# Patient Record
Sex: FEMALE | Race: WHITE | Employment: FULL TIME | URBAN - METROPOLITAN AREA
[De-identification: names, ages, dates, MRNs, and addresses within clinical notes are randomized per-mention and may not be internally consistent; named-entity substitution may affect disease eponyms.]

---

## 2018-01-15 ENCOUNTER — OFFICE VISIT (OUTPATIENT)
Dept: CARDIOLOGY CLINIC | Age: 43
End: 2018-01-15

## 2018-01-15 VITALS
RESPIRATION RATE: 16 BRPM | HEART RATE: 60 BPM | SYSTOLIC BLOOD PRESSURE: 120 MMHG | WEIGHT: 170 LBS | HEIGHT: 65 IN | BODY MASS INDEX: 28.32 KG/M2 | DIASTOLIC BLOOD PRESSURE: 70 MMHG | OXYGEN SATURATION: 99 %

## 2018-01-15 DIAGNOSIS — R07.9 CHEST PAIN, UNSPECIFIED TYPE: Primary | ICD-10-CM

## 2018-01-15 NOTE — PROGRESS NOTES
HISTORY OF PRESENT ILLNESS  Yecenia Benavidez is a 38 y.o. female. 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 Cardiovascular: Positive for chest pain and palpitations. Physical Exam  Physical Exam   Blood pressure 120/70, pulse 60, resp. rate 16, height 5' 4.5\" (1.638 m), weight 77.1 kg (170 lb), SpO2 99 %. Constitutional: She is oriented to person, place, and time. She appears well-developed and well-nourished. No distress. HENT: Head: Normocephalic. Eyes: No scleral icterus. Neck: Normal range of motion. Neck supple. No JVD present. No tracheal deviation present. Cardiovascular: Normal rate, regular rhythm, normal heart sounds and intact distal pulses. Exam reveals no gallop and no friction rub. No murmur heard. Pulmonary/Chest: Effort normal and breath sounds normal. No stridor. No respiratory distress, wheezes or  rales. Abdominal: She exhibits no distension. Musculoskeletal: She exhibits no edema. Neurological: She is alert and oriented to person, place, and time. Coordination normal.   Skin: Skin is warm. No rash noted. Not diaphoretic. No erythema. Psychiatric:  Normal mood and affect. Behavior is normal.   No current outpatient prescriptions on file prior to visit. No current facility-administered medications on file prior to visit.       No results found for: CHOL, CHOLPOCT, CHOLX, CHLST, CHOLV, HDL, HDLPOC, LDL, LDLCPOC, LDLC, DLDLP, VLDLC, VLDL, TGLX, TRIGL, TRIGP, TGLPOCT, CHHD, CHHDX    ASSESSMENT and PLAN  CP: in some ways atypical and I suspect muscle skeletal and stress induced but occurrence also of jaw pain needs further evaluation  Her ECG at there pcp office showed NSR and no significant st t changes  Discussed options  Proceed with stress echo  I suspect benign pvc as well but hold off on 24 hours holter for now  Decrease caffeine intake, weight loss and routine exercise discussed  Her BP is well controlled  Her tsh is normal and her cholesterol is slightly elevated she will follow with her pcp for cholesterol evaluation  I will see on a prn base if stress echo is normal

## 2018-01-15 NOTE — MR AVS SNAPSHOT
[facility] 
[phone] Patient: Oralia Martin MRN: [id number] Visit Information Date & Time Provider Department Dept. Phone Encounter #  
 1/15/2018 11:00 AM Lord Lisa MD [facility] [place] [phone] [id number] Upcoming Health Maintenance Date Due DTaP/Tdap/Td series (1 - Tdap) 6/20/1994 PAP AKA CERVICAL CYTOLOGY 6/20/1994 Influenza Age 5 to Adult 8/1/2017 Allergies as of 1/15/2018  Review Complete On: 1/15/2018 By: Fanta Doyle No Known Allergies Current Immunizations  Never Reviewed No immunizations on file. Not reviewed this visit Vitals BP Pulse Resp Height(growth percentile) Weight(growth percentile) SpO2  
 120/70 (BP 1 Location: Left arm, BP Patient Position: Sitting) 60 16 5' 4.5\" (1.638 m) 170 lb (77.1 kg) 99% BMI Smoking Status 28.73 kg/m2 Never Smoker Vitals History BMI and BSA Data Body Mass Index Body Surface Area 28.73 kg/m 2 1.87 m 2 Your Updated Medication List  
  
   
This list is accurate as of: 1/15/18 12:16 PM.  Always use your most recent med list. ADVIL 100 mg tablet Generic drug:  ibuprofen Take 100 mg by mouth every six (6) hours as needed for Pain. Patient Instructions Stress Echo (Will call result) Introducing [facility]! Rolando Daugherty introduces Ekotrope patient portal. Now you can access parts of your medical record, email your doctor's office, and request medication refills online. 1. In your internet browser, go to [email or web address] 2. Click on the First Time User? Click Here link in the Sign In box. You will see the New Member Sign Up page. 3. Enter your Ekotrope Access Code exactly as it appears below.  You will not need to use this code after youve completed the sign-up process. If you do not sign up before the expiration date, you must request a new code. · TheLadders Access Code: [email or web address] Expires: 4/5/2018  7:16 AM 
 
4. Enter the last four digits of your Social Security Number (xxxx) and Date of Birth (mm/dd/yyyy) as indicated and click Submit. You will be taken to the next sign-up page. 5. Create a TheLadders ID. This will be your TheLadders login ID and cannot be changed, so think of one that is secure and easy to remember. 6. Create a TheLadders password. You can change your password at any time. 7. Enter your Password Reset Question and Answer. This can be used at a later time if you forget your password. 8. Enter your e-mail address. You will receive e-mail notification when new information is available in [address] 9. Click Sign Up. You can now view and download portions of your medical record. 10. Click the Download Summary menu link to download a portable copy of your medical information. If you have questions, please visit the Frequently Asked Questions section of the TheLadders website. Remember, TheLadders is NOT to be used for urgent needs. For medical emergencies, dial 911. Now available from your iPhone and Android! Please provide this summary of care documentation to your next provider. Your primary care clinician is listed as Lula Baca. If you have any questions after today's visit, please call [phone].

## 2020-01-02 ENCOUNTER — OFFICE VISIT (OUTPATIENT)
Dept: INTERNAL MEDICINE CLINIC | Age: 45
End: 2020-01-02

## 2020-01-02 VITALS
BODY MASS INDEX: 30.49 KG/M2 | HEIGHT: 65 IN | HEART RATE: 64 BPM | DIASTOLIC BLOOD PRESSURE: 74 MMHG | RESPIRATION RATE: 18 BRPM | TEMPERATURE: 97.8 F | SYSTOLIC BLOOD PRESSURE: 124 MMHG | WEIGHT: 183 LBS | OXYGEN SATURATION: 99 %

## 2020-01-02 DIAGNOSIS — Z00.00 ENCOUNTER FOR MEDICAL EXAMINATION TO ESTABLISH CARE: Primary | ICD-10-CM

## 2020-01-02 DIAGNOSIS — E66.09 CLASS 1 OBESITY DUE TO EXCESS CALORIES WITHOUT SERIOUS COMORBIDITY IN ADULT, UNSPECIFIED BMI: ICD-10-CM

## 2020-01-02 DIAGNOSIS — M79.672 CHRONIC PAIN OF BOTH FEET: ICD-10-CM

## 2020-01-02 DIAGNOSIS — M79.671 CHRONIC PAIN OF BOTH FEET: ICD-10-CM

## 2020-01-02 DIAGNOSIS — Z13.1 SCREENING FOR DIABETES MELLITUS: ICD-10-CM

## 2020-01-02 DIAGNOSIS — G89.29 CHRONIC PAIN OF BOTH FEET: ICD-10-CM

## 2020-01-02 DIAGNOSIS — Z13.220 SCREENING FOR CHOLESTEROL LEVEL: ICD-10-CM

## 2020-01-02 NOTE — PROGRESS NOTES
Ms. Treasure Bess is a new patient who is here to establish care. CC:  Establish Care and Foot Pain       HPI:    54 yo woman presenting to establish care    Feet pain: Started over the summer in one foot 2019 then progressed to both feet. Pain over plantar aspect and sometimes pain all over feet. Has tried exercises, changing shoes and buying special inserts without improvement. Has gained weight since menopause, gaining 10 lbs per year  Feels better and stronger with exercise but frustrated with difficulty of loosing weight. Has a hx of migraines for several years which improve with ibuprofen. Standing in her feet makes pain worst    SH    Has step daughter  Teacher middle school  Non smoker   No ETOH    Review of systems:  Constitutional: negative for fever, chills, weight loss, night sweats   Eyes : negative for vision changes, eye pain and discharge  Nose and Throat: negative for tinnitus, sore throat   Cardiovascular: negative for chest pain, palpitations and shortness of breath  Respiratory: negative for shortness of breath, cough and wheezing   Gastroinstestinal: negative for abdominal pain, nausea, vomiting, diarrhea, constipation, and blood in the stool  Musculoskeletal: see HPI  Genitourinary: negative for dysuria, nocturia, polyuria and hematuria   Neurologic: Negative for focal weakness, numbness or incoordination  Skin: negative for rash, pruritus  Hematologic: negative for easy bruising      History reviewed. No pertinent past medical history. History reviewed. No pertinent surgical history. No Known Allergies    Current Outpatient Medications on File Prior to Visit   Medication Sig Dispense Refill    ibuprofen (ADVIL) 100 mg tablet Take 100 mg by mouth every six (6) hours as needed for Pain. No current facility-administered medications on file prior to visit.         family history includes Coronary Artery Disease in her mother; Hypertension in her father and mother. Social History     Socioeconomic History    Marital status:      Spouse name: Not on file    Number of children: Not on file    Years of education: Not on file    Highest education level: Not on file   Occupational History    Not on file   Social Needs    Financial resource strain: Not on file    Food insecurity:     Worry: Not on file     Inability: Not on file    Transportation needs:     Medical: Not on file     Non-medical: Not on file   Tobacco Use    Smoking status: Never Smoker    Smokeless tobacco: Never Used   Substance and Sexual Activity    Alcohol use: Yes    Drug use: No    Sexual activity: Yes     Partners: Male   Lifestyle    Physical activity:     Days per week: Not on file     Minutes per session: Not on file    Stress: Not on file   Relationships    Social connections:     Talks on phone: Not on file     Gets together: Not on file     Attends [employer] service: Not on file     Active member of club or organization: Not on file     Attends meetings of clubs or organizations: Not on file     Relationship status: Not on file    Intimate partner violence:     Fear of current or ex partner: Not on file     Emotionally abused: Not on file     Physically abused: Not on file     Forced sexual activity: Not on file   Other Topics Concern    Not on file   Social History Narrative    Not on file       Visit Vitals  /74 (BP 1 Location: Right arm, BP Patient Position: Sitting)   Pulse 64   Temp 97.8 °F (36.6 °C) (Oral)   Resp 18   Ht 5' 4.5\" (1.638 m)   Wt 183 lb (83 kg)   SpO2 99%   BMI 30.93 kg/m²     General:  Well appearing female no acute distress  HEENT:   PERRL,normal conjunctiva. External ear and canals normal, TMs normal.  Hearing normal to voice. Nose without edema or discharge, normal septum. Lips, teeth, gums normal.  Oropharynx: no erythema, no exudates, no lesions, normal tongue. Neck:  Supple. Thyroid normal size, nontender, without nodules.   No carotid bruit. No masses or lymphadenopathy  Respiratory: no respiratory distress,  no wheezing, no rhonchi, no rales. No chest wall tenderness. Cardiovascular:  RRR, normal S1S2, no murmur. Gastrointestinal: normal bowel sounds, soft, nontender, without masses. No hepatosplenomegaly. Extremities +2 pulses, no edema, normal sensation   Musculoskeletal:  Normal gait. Normal digits and nails. Normal strength and tone, no atrophy, and no abnormal movement. Skin:  No rash, no lesions, no ulcers. Skin warm, normal turgor, without induration or nodules. Neuro:  A and OX4, fluent speech, cranial nerves normal 2-12. Sensation normal to light touch. DTR symmetrical  Psych:  Normal affect    Feet exam: normal inspection, no point tenderness              Assessment and Plan:     1. Encounter for medical examination to establish care  - METABOLIC PANEL, COMPREHENSIVE  - CBC WITH AUTOMATED DIFF  - HEMOGLOBIN A1C W/O EAG  - LIPID PANEL    2. Screening for diabetes mellitus  - HEMOGLOBIN A1C W/O EAG    3. Screening for cholesterol level  - LIPID PANEL    4. Class 1 obesity due to excess calories without serious comorbidity in adult, unspecified BMI  Counseled on dietary changes   - REFERRAL TO NUTRITION    5. Chronic pain of both feet : consistent with plantar fascitis, given exercises   - REFERRAL TO PODIATRY      Follow-up and Dispositions    · Return in about 6 months (around 7/2/2020).           Dixon Chatman MD

## 2020-01-02 NOTE — PATIENT INSTRUCTIONS
Office Policies    Phone calls/patient messages:            Please allow up to 24 hours for someone in the office to contact you about your call or message. Be mindful your provider may be out of the office or your message may require further review. We encourage you to use Giftxoxo for your messages as this is a faster, more efficient way to communicate with our office                         Medication Refills:            Prescription medications require 48-72 business hours to process. We encourage you to use Giftxoxo for your refills. For controlled medications: Please allow 72 business hours to process. Certain medications may require you to  a written prescription at our office. NO narcotic/controlled medications will be prescribed after 4pm Monday through Friday or on weekends              Form/Paperwork Completion:            Please note a $25 fee may incur for all paperwork for completed by our providers. We ask that you allow 7-10 business days. Pre-payment is due prior to picking up/faxing the completed form. You may also download your forms to Giftxoxo to have your doctor print off. Plantar Fasciitis: Exercises  Introduction  Here are some examples of exercises for you to try. The exercises may be suggested for a condition or for rehabilitation. Start each exercise slowly. Ease off the exercises if you start to have pain. You will be told when to start these exercises and which ones will work best for you. How to do the exercises  Towel stretch    1. Sit with your legs extended and knees straight. 2. Place a towel around your foot just under the toes. 3. Hold each end of the towel in each hand, with your hands above your knees. 4. Pull back with the towel so that your foot stretches toward you. 5. Hold the position for at least 15 to 30 seconds. 6. Repeat 2 to 4 times a session, up to 5 sessions a day.     Calf stretch    1. Stand facing a wall with your hands on the wall at about eye level. Put the leg you want to stretch about a step behind your other leg. 2. Keeping your back heel on the floor, bend your front knee until you feel a stretch in the back leg. 3. Hold the stretch for 15 to 30 seconds. Repeat 2 to 4 times. Plantar fascia and calf stretch    1. Stand on a step as shown above. Be sure to hold on to the banister. 2. Slowly let your heels down over the edge of the step as you relax your calf muscles. You should feel a gentle stretch across the bottom of your foot and up the back of your leg to your knee. 3. Hold the stretch about 15 to 30 seconds, and then tighten your calf muscle a little to bring your heel back up to the level of the step. Repeat 2 to 4 times. Towel curls    1. While sitting, place your foot on a towel on the floor and scrunch the towel toward you with your toes. 2. Then, also using your toes, push the towel away from you. Hersey pickups    1. Put marbles on the floor next to a cup.  2. Using your toes, try to lift the marbles up from the floor and put them in the cup. Follow-up care is a key part of your treatment and safety. Be sure to make and go to all appointments, and call your doctor if you are having problems. It's also a good idea to know your test results and keep a list of the medicines you take. Where can you learn more? Go to http://teddy-rakel.info/. Lopez Durán in the search box to learn more about \"Plantar Fasciitis: Exercises. \"  Current as of: June 26, 2019  Content Version: 12.2  © 9840-6351 [employer]. Care instructions adapted under license by [employer] (which disclaims liability or warranty for this information). If you have questions about a medical condition or this instruction, always ask your healthcare professional. [place] [address] any warranty or liability for your use of this information.

## 2020-01-02 NOTE — PROGRESS NOTES
Reviewed record in preparation for visit and have obtained necessary documentation. Identified pt with two pt identifiers(name and ). Chief Complaint   Patient presents with   [address] Foot Pain       Health Maintenance Due   Topic Date Due    DTaP/Tdap/Td  (1 - Tdap) 1984    Pap Test  1994    Flu Vaccine  2019       Ms. Nazia Burroughs has a reminder for a \"due or due soon\" health maintenance. I have asked that she discuss this further with her primary care provider for follow-up on this health maintenance. Coordination of Care Questionnaire:  :     1) Have you been to an emergency room, urgent care clinic since your last visit? no   Hospitalized since your last visit? no             2) Have you seen or consulted any other health care providers outside of [address] [place] [address] since your last visit? no  (Include any pap smears or colon screenings in this section.)    3) In the event something were to happen to you and you were unable to speak on your behalf, do you have an Advance Directive/ Living Will in place stating your wishes? NO    Do you have an Advance Directive on file? no    4) Are you interested in receiving information on Advance Directives? NO    Patient is accompanied by self I have received verbal consent from Ynes Ruiz to discuss any/all medical information while they are present in the room.

## 2020-01-03 LAB
ALBUMIN SERPL-MCNC: 4.5 G/DL (ref 3.5–5.5)
ALBUMIN/GLOB SERPL: 1.8 {RATIO} (ref 1.2–2.2)
ALP SERPL-CCNC: 85 IU/L (ref 39–117)
ALT SERPL-CCNC: 16 IU/L (ref 0–32)
AST SERPL-CCNC: 14 IU/L (ref 0–40)
BASOPHILS # BLD AUTO: 0 X10E3/UL (ref 0–0.2)
BASOPHILS NFR BLD AUTO: 1 %
BILIRUB SERPL-MCNC: 0.3 MG/DL (ref 0–1.2)
BUN SERPL-MCNC: 15 MG/DL (ref 6–24)
BUN/CREAT SERPL: 17 (ref 9–23)
CALCIUM SERPL-MCNC: 9.7 MG/DL (ref 8.7–10.2)
CHLORIDE SERPL-SCNC: 104 MMOL/L (ref 96–106)
CHOLEST SERPL-MCNC: 266 MG/DL (ref 100–199)
CO2 SERPL-SCNC: 23 MMOL/L (ref 20–29)
CREAT SERPL-MCNC: 0.9 MG/DL (ref 0.57–1)
EOSINOPHIL # BLD AUTO: 0.1 X10E3/UL (ref 0–0.4)
EOSINOPHIL NFR BLD AUTO: 2 %
ERYTHROCYTE [DISTWIDTH] IN BLOOD BY AUTOMATED COUNT: 15 % (ref 12.3–15.4)
GLOBULIN SER CALC-MCNC: 2.5 G/DL (ref 1.5–4.5)
GLUCOSE SERPL-MCNC: 88 MG/DL (ref 65–99)
HBA1C MFR BLD: 5.4 % (ref 4.8–5.6)
HCT VFR BLD AUTO: 38.9 % (ref 34–46.6)
HDLC SERPL-MCNC: 72 MG/DL
HGB BLD-MCNC: 12.3 G/DL (ref 11.1–15.9)
IMM GRANULOCYTES # BLD AUTO: 0 X10E3/UL (ref 0–0.1)
IMM GRANULOCYTES NFR BLD AUTO: 0 %
LDLC SERPL CALC-MCNC: 171 MG/DL (ref 0–99)
LYMPHOCYTES # BLD AUTO: 2.2 X10E3/UL (ref 0.7–3.1)
LYMPHOCYTES NFR BLD AUTO: 32 %
MCH RBC QN AUTO: 23.1 PG (ref 26.6–33)
MCHC RBC AUTO-ENTMCNC: 31.6 G/DL (ref 31.5–35.7)
MCV RBC AUTO: 73 FL (ref 79–97)
MONOCYTES # BLD AUTO: 0.6 X10E3/UL (ref 0.1–0.9)
MONOCYTES NFR BLD AUTO: 8 %
NEUTROPHILS # BLD AUTO: 3.9 X10E3/UL (ref 1.4–7)
NEUTROPHILS NFR BLD AUTO: 57 %
PLATELET # BLD AUTO: 277 X10E3/UL (ref 150–450)
POTASSIUM SERPL-SCNC: 4.4 MMOL/L (ref 3.5–5.2)
PROT SERPL-MCNC: 7 G/DL (ref 6–8.5)
RBC # BLD AUTO: 5.33 X10E6/UL (ref 3.77–5.28)
SODIUM SERPL-SCNC: 142 MMOL/L (ref 134–144)
TRIGL SERPL-MCNC: 116 MG/DL (ref 0–149)
VLDLC SERPL CALC-MCNC: 23 MG/DL (ref 5–40)
WBC # BLD AUTO: 6.8 X10E3/UL (ref 3.4–10.8)

## 2020-01-12 NOTE — PROGRESS NOTES
Normal kidney and liver   Normal blood count   no diabetes  Cholesterol is elevated work on diet changes and exercise ( low fat, more grains) and repeat in 6 months if still in the same range will need to start cholesterol lowering agent

## 2020-02-03 ENCOUNTER — HOSPITAL ENCOUNTER (OUTPATIENT)
Dept: NUTRITION | Age: 45
Discharge: HOME | End: 2020-02-03
Payer: COMMERCIAL

## 2020-02-03 PROCEDURE — 97802 MEDICAL NUTRITION INDIV IN: CPT | Performed by: DIETITIAN, REGISTERED

## 2020-02-03 NOTE — PROGRESS NOTES
[address] [facility]     Nutrition Assessment  Medical Nutrition Therapy   Outpatient Initial Evaluation         Patient Name: Damian Peguero : 1975   Treatment Diagnosis: Obesity Class 1   Referral Source: Mony Hawley MD [facility] [place]): 2/3/2020     Gender: female Age: 53 y.o. Ht: 64.5 in Wt:  181.2 lb  kg   BMI: 30.6 BMR   Male  BMR Female 1455     Past Medical History:  Migrans, unintended weight gain, chest pain  Wt Readings from Last 3 Encounters:   20 83 kg (183 lb)   01/15/18 77.1 kg (170 lb)   wt at which felt most comfortable - 140#  Notes unhealthy weight loss due to not eating out of stress years ago. Pertinent Medications:     Current Outpatient Medications:     ibuprofen (ADVIL) 100 mg tablet, Take 100 mg by mouth every six (6) hours as needed for Pain., Disp: , Rfl:      Biochemical Data:   Lab Results   Component Value Date/Time    Hemoglobin A1c 5.4 2020 08:57 AM      Lab Results   Component Value Date/Time    Sodium 142 2020 08:57 AM    Potassium 4.4 2020 08:57 AM    Chloride 104 2020 08:57 AM    CO2 23 2020 08:57 AM    Glucose 88 2020 08:57 AM    BUN 15 2020 08:57 AM    Creatinine 0.90 2020 08:57 AM    BUN/Creatinine ratio 17 2020 08:57 AM    GFR est AA 89 2020 08:57 AM    GFR est non-AA 77 2020 08:57 AM    Calcium 9.7 2020 08:57 AM    Bilirubin, total 0.3 2020 08:57 AM    AST (SGOT) 14 2020 08:57 AM    Alk.  phosphatase 85 2020 08:57 AM    Protein, total 7.0 2020 08:57 AM    Albumin 4.5 2020 08:57 AM    A-G Ratio 1.8 2020 08:57 AM    ALT (SGPT) 16 2020 08:57 AM     Lab Results   Component Value Date/Time    Cholesterol, total 266 (H) 2020 08:57 AM    HDL Cholesterol 72 2020 08:57 AM    LDL, calculated 171 (H) 2020 08:57 AM    VLDL, calculated 23 2020 08:57 AM    Triglyceride 116 2020 08:57 AM        Assessment:      Pt is a 49yo female who has a 11 yo daughter and . She works in a high stress job as a teacher at [employer] Estimates 10-12 hours of work per day. Notes perimenopause symptoms for the past 4 years. Weight gain after plantar facitis caused inability to walk. Has a gym membership but due to time constraints has not been able to go regularly. Little sleep each night. Broken up 1-2 times per night with kid, dog, or hot flashes. Food & Nutrition: Notes typically larger portions than others around her. Difficulty with being a teacher to drink or eat during the school day except at designated times. Must use grab and go items she is able to eat and walk with. B- belvita/protein bar and coffee  S- belvita or fruit and more coffee  L- small due to limited time. Feeling very hungry. Ex: sandwich or salad + seltzer  Home ravenous  Snacking while making dinner: leftovers, chips and salsa, fruit  D- largest meal: rice, chicken, minimal vegetables.  and daughter are picky eaters  S- trying to limit  Drinking coffee throughout the day. Minimal water till home. Years ago was lots of soda. Now off. Wine on social occasions rarely  Loves ice cream but does not keep in house due to portion control issues     Estimate Needs   Calories: 5361-7415  Protein: 100 Carbs: 180 Fat: 53   Kcal/day  g/day  g/day  g/day        percent: 25  45  30               Nutrition Diagnosis obesity R/T excessive energy intake from large meals and snacks in 2nd half of the day secondary to hunger from going long periods without eating AEB BMI >30     Nutrition Intervention &  Education: Educated pt on the basics of healthy weight loss the rationale for dietary modifications and increased activity. Educated pt on lean proteins, healthy fats, non-starchy vegetables, and complex carbohydrate food sources. Discussed ,  meal timing, and appropriate serving sizes.  Created meal and snack ideas using Meal Builder   Handouts Provided: [x]  Carbohydrates  [x]  Protein  [x]  Non-starchy Vegatbles  []  Food Label  [x]  Meal and Snack Ideas  []  Food Journals []  Diabetes  []  Cholesterol  []  Sodium  []  Gen Nutr Guidelines  []  SBGM Guidelines  []  Others:   Information Reviewed with: pt   Readiness to Change Stage: []  Pre-contemplative    []  Contemplative  []  Preparation               []  Action                  []  Maintenance   Potential Barriers to Learning: []  Decline in memory    []  Language barrier   []  Other:  [x]  Emotional/ stress      []  Limited mobility  []  Lack of motivation     [] Vision, hearing or cognitive impairment   Expected Compliance: Fair due to perceived time barriers and lack of planning, lack of sleep     Nutritional Goal - To promote lifestyle changes to result in:    [x]  Weight loss  []  Improved diabetic control  []  Decreased cholesterol levels  []  Decreased blood pressure  []  Weight maintenance []  Preventing any interactions associated with food allergies  []  Adequate weight gain toward goal weight  []  Other:        Patient Goals:   - Eat 3 meals + 1-2 optional snacks each day.  (include complex carbohydrates + protein) - meals last 3-4 hours, snacks last 1-2 hours    - Improve physical activity w/goal to do weights 2 days per week, curves 1-2 days per week, and try walking as able 15min to start    -increase water intake by sipping slowly throughout the day   Total Time Spent: 60min    Dietitian Signature: Bryan Benjamin MS, RD, CSSD Date: 2/3/2020   Follow-up: 4 weeks Time: 6:27 PM

## 2022-03-18 PROBLEM — R07.9 CHEST PAIN: Status: ACTIVE | Noted: 2018-01-15

## 2023-06-06 ENCOUNTER — TELEPHONE (OUTPATIENT)
Age: 48
End: 2023-06-06

## 2023-06-06 NOTE — TELEPHONE ENCOUNTER
Two pt identifiers confirmed. I advised the patient, because she has not been seen in over 3 years, she is a new patient. The patient stated she is in a lot of pain, what should she do. I told her she can go to UC, ED or Ortho on Call. The patient would like to get in sooner than her 07/24/23 new patient appt with , if that is possible. I told her I would ask 's PSR know about request.     Pt verbalized understanding of information discussed w/ no further questions at this time.       [address]  [facility], [address] [place] [address] [address] [place] [address]  W: [phone]  F: [phone]

## 2023-06-06 NOTE — TELEPHONE ENCOUNTER
Pt states that two months ago she twisted wrong and has had the hip pain every since. She has trouble walking some days as well. She needs to be seen soon, but there are no appts. Please call to advise.

## 2023-07-17 ENCOUNTER — NURSE TRIAGE (OUTPATIENT)
Dept: OTHER | Facility: CLINIC | Age: 48
End: 2023-07-17

## 2023-07-17 NOTE — TELEPHONE ENCOUNTER
[facility]    Received call from [address] at [facility]; Patient with Red Flag Complaint requesting to establish care with [facility] 3rd floor. Subjective: Caller states \"Knee pain\"     Current Symptoms:   Intermittent, right knee pain  Burning sensation below the knee. \"If I press on it it feels like a hot iron. \"  Kneeling down worsens the pain  Denies redness or rash to the knee    Also, c/o right hip pain x 1 month and right ear discomfort x 2 weeks. Able to ambulate normally    Onset: 3 months ago; worsening    Pain Severity: 0/10; burning; intermittent    Temperature: Denies    What has been tried: Massage therapy for hip pain    Recommended disposition: See in Office Within 2 Weeks  Advised UCC if no available appts. Care advice provided, patient verbalizes understanding; denies any other questions or concerns; instructed to call back for any new or worsening symptoms. Patient/Caller agrees with recommended disposition; writer provided warm transfer to [facility] at [facility] for appointment scheduling    Attention Provider: Thank you for allowing me to participate in the care of your patient. The patient was connected to triage in response to information provided to the [facility]. Please do not respond through this encounter as the response is not directed to a shared pool.     Reason for Disposition   Knee pain is a chronic symptom (recurrent or ongoing AND present > 4 weeks)    Protocols used: Knee Pain-ADULT-

## 2023-08-07 ENCOUNTER — OFFICE VISIT (OUTPATIENT)
Age: 48
End: 2023-08-07
Payer: COMMERCIAL

## 2023-08-07 VITALS
OXYGEN SATURATION: 96 % | BODY MASS INDEX: 33.46 KG/M2 | WEIGHT: 196 LBS | HEIGHT: 64 IN | DIASTOLIC BLOOD PRESSURE: 71 MMHG | RESPIRATION RATE: 16 BRPM | HEART RATE: 62 BPM | TEMPERATURE: 97.3 F | SYSTOLIC BLOOD PRESSURE: 121 MMHG

## 2023-08-07 DIAGNOSIS — G43.909 MIGRAINE WITHOUT STATUS MIGRAINOSUS, NOT INTRACTABLE, UNSPECIFIED MIGRAINE TYPE: ICD-10-CM

## 2023-08-07 DIAGNOSIS — Z12.11 SCREEN FOR COLON CANCER: ICD-10-CM

## 2023-08-07 DIAGNOSIS — D56.9 THALASSEMIA, UNSPECIFIED TYPE: ICD-10-CM

## 2023-08-07 DIAGNOSIS — H35.60 RETINAL HEMORRHAGE, UNSPECIFIED LATERALITY: Primary | ICD-10-CM

## 2023-08-07 DIAGNOSIS — M25.551 RIGHT HIP PAIN: ICD-10-CM

## 2023-08-07 DIAGNOSIS — E78.5 HYPERLIPIDEMIA, UNSPECIFIED HYPERLIPIDEMIA TYPE: ICD-10-CM

## 2023-08-07 DIAGNOSIS — M25.561 RIGHT KNEE PAIN, UNSPECIFIED CHRONICITY: ICD-10-CM

## 2023-08-07 DIAGNOSIS — Z11.59 ENCOUNTER FOR HEPATITIS C SCREENING TEST FOR LOW RISK PATIENT: ICD-10-CM

## 2023-08-07 DIAGNOSIS — Z11.4 SCREENING FOR HUMAN IMMUNODEFICIENCY VIRUS WITHOUT PRESENCE OF RISK FACTORS: ICD-10-CM

## 2023-08-07 PROCEDURE — 99204 OFFICE O/P NEW MOD 45 MIN: CPT | Performed by: INTERNAL MEDICINE

## 2023-08-07 ASSESSMENT — PATIENT HEALTH QUESTIONNAIRE - PHQ9
2. FEELING DOWN, DEPRESSED OR HOPELESS: 0
SUM OF ALL RESPONSES TO PHQ9 QUESTIONS 1 & 2: 0
SUM OF ALL RESPONSES TO PHQ QUESTIONS 1-9: 0
1. LITTLE INTEREST OR PLEASURE IN DOING THINGS: 0

## 2023-08-07 ASSESSMENT — ANXIETY QUESTIONNAIRES
4. TROUBLE RELAXING: 3
IF YOU CHECKED OFF ANY PROBLEMS ON THIS QUESTIONNAIRE, HOW DIFFICULT HAVE THESE PROBLEMS MADE IT FOR YOU TO DO YOUR WORK, TAKE CARE OF THINGS AT HOME, OR GET ALONG WITH OTHER PEOPLE: NOT DIFFICULT AT ALL
7. FEELING AFRAID AS IF SOMETHING AWFUL MIGHT HAPPEN: 0
6. BECOMING EASILY ANNOYED OR IRRITABLE: 3
GAD7 TOTAL SCORE: 15
5. BEING SO RESTLESS THAT IT IS HARD TO SIT STILL: 3
2. NOT BEING ABLE TO STOP OR CONTROL WORRYING: 0
1. FEELING NERVOUS, ANXIOUS, OR ON EDGE: 3
3. WORRYING TOO MUCH ABOUT DIFFERENT THINGS: 3

## 2023-08-07 NOTE — PROGRESS NOTES
PROGRESS NOTE  Name: Luis Haynes   : 1975       ASSESSMENT/ PLAN:     Breana Guerrero was seen today for new patient. Retinal hemorrhage, unspecified laterality: Per ophthalmology. Right hip pain: This has largely resolved with deep tissue massage and time. Right knee pain, unspecified chronicity  -     AFL - Gagan Dillard MD, Orthopedic Surgery (knee), [place]    Migraine without status migrainosus, not intractable, unspecified migraine type: Ongoing. Thalassemia, unspecified type  -     CBC with Auto Differential; Future    Screen for colon cancer  -     AFL - Lacey Borja MD, Gastroenterology, [place]    Hyperlipidemia, unspecified hyperlipidemia type  -     Comprehensive Metabolic Panel; Future  -     Lipid Panel; Future    BMI 33.0-33.9,adult  -     TSH; Future    Encounter for hepatitis C screening test for low risk patient  -     Hepatitis C Antibody; Future    Screening for human immunodeficiency virus without presence of risk factors  -     HIV 1/2 Ag/Ab, 4TH Generation,W Rflx Confirm; Future    Return in about 1 year (around 2024) for CPE. I have reviewed the patient's medications and risks/side effects/benefits were discussed. Diagnosis(-es) explained to patient and questions answered. Literature provided where appropriate. SUBJECTIVE:   Ms. Luis Haynes is a 46 y.o. female who is here for follow up of routine medical issues. She saw Dr. David Hernandez one time, more than 3 years ago. Chief Complaint   Patient presents with    New Patient       She was noted by her eye doctor to have a retinal hemorrhage. \"He wanted my blood pressure checked. \"    She had some pain in R hip and R knee in May and . She got massage . \"There is a spot under my knee where I had sharp pain; I thought I had kneeled on a thumb tack. \" It's only there when she kneels on it. Weight: 183 lb in 2020. \"I've gained 70 lbs since menopause 10 years ago. \"  Wt Readings

## 2023-08-07 NOTE — PROGRESS NOTES
1. \"Have you been to the ER, urgent care clinic since your last visit? Hospitalized since your last visit? \" No    2. \"Have you seen or consulted any other health care providers outside of the [address] [place] [address] since your last visit? \" No     3. For patients aged 43-73: Has the patient had a colonoscopy / FIT/ Cologuard? No      If the patient is female:    4. For patients aged 43-66: Has the patient had a mammogram within the past 2 years? Yes - no Care Gap present      5. For patients aged 21-65: Has the patient had a pap smear?  Yes - no Care Gap present

## 2023-08-08 LAB
ALBUMIN SERPL-MCNC: 4.5 G/DL (ref 3.9–4.9)
ALBUMIN/GLOB SERPL: 1.9 {RATIO} (ref 1.2–2.2)
ALP SERPL-CCNC: 78 IU/L (ref 44–121)
ALT SERPL-CCNC: 19 IU/L (ref 0–32)
AST SERPL-CCNC: 12 IU/L (ref 0–40)
BILIRUB SERPL-MCNC: <0.2 MG/DL (ref 0–1.2)
BUN SERPL-MCNC: 16 MG/DL (ref 6–24)
BUN/CREAT SERPL: 19 (ref 9–23)
CALCIUM SERPL-MCNC: 9.2 MG/DL (ref 8.7–10.2)
CHLORIDE SERPL-SCNC: 103 MMOL/L (ref 96–106)
CHOLEST SERPL-MCNC: 274 MG/DL (ref 100–199)
CO2 SERPL-SCNC: 21 MMOL/L (ref 20–29)
CREAT SERPL-MCNC: 0.86 MG/DL (ref 0.57–1)
EGFRCR SERPLBLD CKD-EPI 2021: 82 ML/MIN/1.73
GLOBULIN SER CALC-MCNC: 2.4 G/DL (ref 1.5–4.5)
GLUCOSE SERPL-MCNC: 80 MG/DL (ref 70–99)
HCV IGG SERPL QL IA: NON REACTIVE
HDLC SERPL-MCNC: 53 MG/DL
HIV 1+2 AB+HIV1 P24 AG SERPL QL IA: NON REACTIVE
LDLC SERPL CALC-MCNC: 161 MG/DL (ref 0–99)
POTASSIUM SERPL-SCNC: 3.9 MMOL/L (ref 3.5–5.2)
PROT SERPL-MCNC: 6.9 G/DL (ref 6–8.5)
SODIUM SERPL-SCNC: 140 MMOL/L (ref 134–144)
TRIGL SERPL-MCNC: 321 MG/DL (ref 0–149)
TSH SERPL DL<=0.005 MIU/L-ACNC: 1.78 UIU/ML (ref 0.45–4.5)
VLDLC SERPL CALC-MCNC: 60 MG/DL (ref 5–40)

## 2023-08-09 LAB
ALBUMIN SERPL-MCNC: 4.2 G/DL (ref 3.9–4.9)
ALBUMIN/GLOB SERPL: 1.7 {RATIO} (ref 1.2–2.2)
ALP SERPL-CCNC: 80 IU/L (ref 44–121)
ALT SERPL-CCNC: 17 IU/L (ref 0–32)
AST SERPL-CCNC: 13 IU/L (ref 0–40)
BASOPHILS # BLD AUTO: 0.1 X10E3/UL (ref 0–0.2)
BASOPHILS NFR BLD AUTO: 1 %
BILIRUB SERPL-MCNC: <0.2 MG/DL (ref 0–1.2)
BUN SERPL-MCNC: 16 MG/DL (ref 6–24)
BUN/CREAT SERPL: 18 (ref 9–23)
CALCIUM SERPL-MCNC: 9.3 MG/DL (ref 8.7–10.2)
CHLORIDE SERPL-SCNC: 101 MMOL/L (ref 96–106)
CHOLEST SERPL-MCNC: 283 MG/DL (ref 100–199)
CO2 SERPL-SCNC: 23 MMOL/L (ref 20–29)
CREAT SERPL-MCNC: 0.91 MG/DL (ref 0.57–1)
EGFRCR SERPLBLD CKD-EPI 2021: 77 ML/MIN/1.73
EOSINOPHIL # BLD AUTO: 0.2 X10E3/UL (ref 0–0.4)
EOSINOPHIL NFR BLD AUTO: 2 %
ERYTHROCYTE [DISTWIDTH] IN BLOOD BY AUTOMATED COUNT: 15.7 % (ref 11.7–15.4)
GLOBULIN SER CALC-MCNC: 2.5 G/DL (ref 1.5–4.5)
GLUCOSE SERPL-MCNC: 79 MG/DL (ref 70–99)
HCT VFR BLD AUTO: 39.5 % (ref 34–46.6)
HCV IGG SERPL QL IA: NON REACTIVE
HDLC SERPL-MCNC: 53 MG/DL
HGB BLD-MCNC: 12 G/DL (ref 11.1–15.9)
HIV 1+2 AB+HIV1 P24 AG SERPL QL IA: NON REACTIVE
IMM GRANULOCYTES # BLD AUTO: 0 X10E3/UL (ref 0–0.1)
IMM GRANULOCYTES NFR BLD AUTO: 0 %
LDLC SERPL CALC-MCNC: 169 MG/DL (ref 0–99)
LYMPHOCYTES # BLD AUTO: 2.1 X10E3/UL (ref 0.7–3.1)
LYMPHOCYTES NFR BLD AUTO: 25 %
MCH RBC QN AUTO: 22.9 PG (ref 26.6–33)
MCHC RBC AUTO-ENTMCNC: 30.4 G/DL (ref 31.5–35.7)
MCV RBC AUTO: 75 FL (ref 79–97)
MONOCYTES # BLD AUTO: 0.7 X10E3/UL (ref 0.1–0.9)
MONOCYTES NFR BLD AUTO: 8 %
NEUTROPHILS # BLD AUTO: 5.6 X10E3/UL (ref 1.4–7)
NEUTROPHILS NFR BLD AUTO: 64 %
PLATELET # BLD AUTO: 321 X10E3/UL (ref 150–450)
POTASSIUM SERPL-SCNC: 3.8 MMOL/L (ref 3.5–5.2)
PROT SERPL-MCNC: 6.7 G/DL (ref 6–8.5)
RBC # BLD AUTO: 5.24 X10E6/UL (ref 3.77–5.28)
SODIUM SERPL-SCNC: 139 MMOL/L (ref 134–144)
TRIGL SERPL-MCNC: 319 MG/DL (ref 0–149)
TSH SERPL DL<=0.005 MIU/L-ACNC: 1.79 UIU/ML (ref 0.45–4.5)
VLDLC SERPL CALC-MCNC: 61 MG/DL (ref 5–40)
WBC # BLD AUTO: 8.6 X10E3/UL (ref 3.4–10.8)

## 2023-08-16 ENCOUNTER — TELEPHONE (OUTPATIENT)
Age: 48
End: 2023-08-16

## 2023-08-16 NOTE — TELEPHONE ENCOUNTER
Faxed O/N and lab results to [facility]. Pt is changing PCP to someone who is closer. Closing out message.  Scanned fax to chart

## 2023-08-16 NOTE — TELEPHONE ENCOUNTER
----- Message from Sherice Uriarte sent at 8/16/2023  8:58 AM EDT -----  Subject: Message to Provider    QUESTIONS  Information for Provider? Patient is wanting her medical records and blood   work faxed to [facility]. Their fax number is [phone]. Please reach out to patient once completed. ---------------------------------------------------------------------------  --------------  Ricarda VICENTE  [phone]; OK to respond with electronic message via [employer] portal (only   for patients who have registered [employer] account)  ---------------------------------------------------------------------------  --------------  SCRIPT ANSWERS  Relationship to Patient?  Self

## 2023-09-08 ENCOUNTER — OFFICE VISIT (OUTPATIENT)
Age: 48
End: 2023-09-08
Payer: COMMERCIAL

## 2023-09-08 VITALS
DIASTOLIC BLOOD PRESSURE: 73 MMHG | OXYGEN SATURATION: 96 % | SYSTOLIC BLOOD PRESSURE: 116 MMHG | HEART RATE: 77 BPM | RESPIRATION RATE: 16 BRPM | TEMPERATURE: 97.7 F | BODY MASS INDEX: 33.46 KG/M2 | WEIGHT: 196 LBS | HEIGHT: 64 IN

## 2023-09-08 DIAGNOSIS — R06.09 DYSPNEA ON EXERTION: ICD-10-CM

## 2023-09-08 DIAGNOSIS — R07.9 CHEST PAIN, UNSPECIFIED TYPE: Primary | ICD-10-CM

## 2023-09-08 DIAGNOSIS — Z76.89 ENCOUNTER TO ESTABLISH CARE: ICD-10-CM

## 2023-09-08 DIAGNOSIS — F43.22 ADJUSTMENT DISORDER WITH ANXIOUS MOOD: ICD-10-CM

## 2023-09-08 DIAGNOSIS — E78.2 MIXED HYPERLIPIDEMIA: ICD-10-CM

## 2023-09-08 DIAGNOSIS — Z82.49 FAMILY HISTORY OF EARLY CAD: ICD-10-CM

## 2023-09-08 DIAGNOSIS — D56.9 THALASSEMIA, UNSPECIFIED TYPE: ICD-10-CM

## 2023-09-08 PROCEDURE — 99204 OFFICE O/P NEW MOD 45 MIN: CPT | Performed by: STUDENT IN AN ORGANIZED HEALTH CARE EDUCATION/TRAINING PROGRAM

## 2023-09-08 SDOH — ECONOMIC STABILITY: HOUSING INSECURITY
IN THE LAST 12 MONTHS, WAS THERE A TIME WHEN YOU DID NOT HAVE A STEADY PLACE TO SLEEP OR SLEPT IN A SHELTER (INCLUDING NOW)?: NO

## 2023-09-08 SDOH — ECONOMIC STABILITY: FOOD INSECURITY: WITHIN THE PAST 12 MONTHS, YOU WORRIED THAT YOUR FOOD WOULD RUN OUT BEFORE YOU GOT MONEY TO BUY MORE.: NEVER TRUE

## 2023-09-08 SDOH — ECONOMIC STABILITY: FOOD INSECURITY: WITHIN THE PAST 12 MONTHS, THE FOOD YOU BOUGHT JUST DIDN'T LAST AND YOU DIDN'T HAVE MONEY TO GET MORE.: NEVER TRUE

## 2023-09-08 SDOH — ECONOMIC STABILITY: INCOME INSECURITY: HOW HARD IS IT FOR YOU TO PAY FOR THE VERY BASICS LIKE FOOD, HOUSING, MEDICAL CARE, AND HEATING?: NOT VERY HARD

## 2023-09-08 ASSESSMENT — ENCOUNTER SYMPTOMS
EYE PAIN: 0
CONSTIPATION: 0
BACK PAIN: 0
COLOR CHANGE: 0
ABDOMINAL PAIN: 0
WHEEZING: 0
RHINORRHEA: 0
DIARRHEA: 0
VOMITING: 0
SHORTNESS OF BREATH: 1
COUGH: 0
NAUSEA: 0
CHEST TIGHTNESS: 0

## 2023-09-08 NOTE — ASSESSMENT & PLAN NOTE
ASCVD risk 1.7% and LDL < 190.  Discussed with patient, if stress test abnormal will plan to start statin therapy

## 2023-09-08 NOTE — ASSESSMENT & PLAN NOTE
Says she is starting to feel somewhat better, looking for a therapist to get established with. Does not feel she needs to start medication now, no depressed mood, just feels overwhelmed.

## 2023-09-08 NOTE — PROGRESS NOTES
Joss Torres is a 46y.o. year old female who is a new patient to me today (09/08/23). She recently established care with a different primary care office but says she did not feel like she connected with the physician she saw and so she is here today to establish. Assessment & Plan:   1. Chest pain, unspecified type  Assessment & Plan:  History concerning for ischemic cause and in light of family history, ordered stress test, if abnormal will refer to cardiology for cath. Advised her that if she has recurrence of her chest pain at any time she should proceed to the ED. 2. Dyspnea on exertion  3. Family history of early CAD  3. Mixed hyperlipidemia  Assessment & Plan:  ASCVD risk 1.7% and LDL < 190. Discussed with patient, if stress test abnormal will plan to start statin therapy  5. Adjustment disorder with anxious mood  Assessment & Plan:   Says she is starting to feel somewhat better, looking for a therapist to get established with. Does not feel she needs to start medication now, no depressed mood, just feels overwhelmed. 6. Thalassemia, unspecified type  7. Encounter to establish care      Will review stress test results/notify patient as above. Otherwise, return in about 1 year (around 9/8/2024), or if symptoms worsen or fail to improve. Subjective:   Andi Hernández was seen today for Establish Care  She recently had labs completed and has a referral for colonoscopy to get up to date on preventive care. Today she says her main concern is abnormal labwork, and stress. She also reports an episode of chest pain about two weeks ago, she says the pain lasted for about two days, at one point was so severe she considered calling 911. She describes it as central, crushing, and reports associated shortness of breath and radiation into her right arm. She also reports a significant family history of coronary artery disease.    Regarding her stress, she says that she has been feeling overwhelmed lately due to

## 2023-09-15 ENCOUNTER — HOSPITAL ENCOUNTER (OUTPATIENT)
Facility: HOSPITAL | Age: 48
Discharge: HOME | End: 2023-09-15
Attending: STUDENT IN AN ORGANIZED HEALTH CARE EDUCATION/TRAINING PROGRAM
Payer: COMMERCIAL

## 2023-09-15 ENCOUNTER — TELEPHONE (OUTPATIENT)
Age: 48
End: 2023-09-15

## 2023-09-15 DIAGNOSIS — I20.0 UNSTABLE ANGINA (HCC): ICD-10-CM

## 2023-09-15 DIAGNOSIS — R94.39 ABNORMAL STRESS TEST: Primary | ICD-10-CM

## 2023-09-15 DIAGNOSIS — R07.9 CHEST PAIN, UNSPECIFIED TYPE: ICD-10-CM

## 2023-09-15 LAB
EKG DIAGNOSIS: NORMAL
STRESS ANGINA INDEX: 1
STRESS BASELINE DIAS BP: 71 MMHG
STRESS BASELINE HR: 55 BPM
STRESS BASELINE SYS BP: 117 MMHG
STRESS ESTIMATED WORKLOAD: 9 METS
STRESS PEAK DIAS BP: 88 MMHG
STRESS PEAK SYS BP: 138 MMHG
STRESS PERCENT HR ACHIEVED: 96 %
STRESS POST PEAK HR: 164 BPM
STRESS RATE PRESSURE PRODUCT: NORMAL BPM*MMHG
STRESS ST DEPRESSION: 1.5 MM
STRESS STAGE 1 BP: NORMAL MMHG
STRESS STAGE 1 HR: 114 BPM
STRESS STAGE 2 COMMENTS: NORMAL
STRESS STAGE 2 HR: 146 BPM
STRESS STAGE 3 HR: 164 BPM
STRESS STAGE RECOVERY 1 COMMENTS: NORMAL
STRESS STAGE RECOVERY 1 HR: 129 BPM
STRESS STAGE RECOVERY 2 HR: 106 BPM
STRESS STAGE RECOVERY 3 BP: NORMAL MMHG
STRESS STAGE RECOVERY 3 COMMENTS: NORMAL
STRESS STAGE RECOVERY 3 HR: 82 BPM
STRESS TARGET HR: 170 BPM

## 2023-09-15 PROCEDURE — 93016 CV STRESS TEST SUPVJ ONLY: CPT | Performed by: SPECIALIST

## 2023-09-15 PROCEDURE — 93017 CV STRESS TEST TRACING ONLY: CPT

## 2023-09-15 PROCEDURE — 93018 CV STRESS TEST I&R ONLY: CPT | Performed by: SPECIALIST

## 2023-09-15 NOTE — TELEPHONE ENCOUNTER
Reason for call:  TC from pt. Pt id verified. Pt states she received a call from Dr. Marcin Young and she was returning her call. Pt states she had a stress test done today and feels that is why Dr. Marcin Young is calling. Pt states it's best to get her after 3 p.m.      Is this a new problem: No    Date of last appointment:  9/8/2023     Can we respond via [employer]: No    Best call back number: [phone]

## 2024-05-08 ENCOUNTER — TELEPHONE (OUTPATIENT)
Age: 49
End: 2024-05-08

## 2024-05-08 ENCOUNTER — OFFICE VISIT (OUTPATIENT)
Age: 49
End: 2024-05-08
Payer: COMMERCIAL

## 2024-05-08 VITALS
TEMPERATURE: 98.6 F | OXYGEN SATURATION: 96 % | WEIGHT: 199 LBS | RESPIRATION RATE: 16 BRPM | SYSTOLIC BLOOD PRESSURE: 113 MMHG | HEIGHT: 65 IN | BODY MASS INDEX: 33.15 KG/M2 | DIASTOLIC BLOOD PRESSURE: 79 MMHG | HEART RATE: 80 BPM

## 2024-05-08 DIAGNOSIS — J01.90 ACUTE NON-RECURRENT SINUSITIS, UNSPECIFIED LOCATION: Primary | ICD-10-CM

## 2024-05-08 PROCEDURE — 99213 OFFICE O/P EST LOW 20 MIN: CPT | Performed by: STUDENT IN AN ORGANIZED HEALTH CARE EDUCATION/TRAINING PROGRAM

## 2024-05-08 RX ORDER — ACETAMINOPHEN 325 MG/1
650 TABLET ORAL EVERY 6 HOURS PRN
COMMUNITY

## 2024-05-08 ASSESSMENT — PATIENT HEALTH QUESTIONNAIRE - PHQ9
2. FEELING DOWN, DEPRESSED OR HOPELESS: NOT AT ALL
SUM OF ALL RESPONSES TO PHQ QUESTIONS 1-9: 0
SUM OF ALL RESPONSES TO PHQ QUESTIONS 1-9: 0
SUM OF ALL RESPONSES TO PHQ9 QUESTIONS 1 & 2: 0
1. LITTLE INTEREST OR PLEASURE IN DOING THINGS: NOT AT ALL
SUM OF ALL RESPONSES TO PHQ QUESTIONS 1-9: 0
SUM OF ALL RESPONSES TO PHQ QUESTIONS 1-9: 0

## 2024-05-08 NOTE — TELEPHONE ENCOUNTER
Patient called the office requesting a script that should've been sent to the pharmacy at her appointment. Per pcp patient advised that she needs to get OTC allegra, sudafed and flonase. Patient expressed intent to comply

## 2024-05-08 NOTE — PROGRESS NOTES
Maya Weinstein (: 1975) is a 48 y.o. female patient here for evaluation of the following chief complaint(s):  Wrist Pain (Right arm and wrist pain and weakness x 6 months ) and Congestion (Nasal congestion x 4 days )       Subjective:   She reports feeling unwell starting  - had body aches and chills. She denies sore throat but says she has had significant sinus congestion on the left side of her head. She has not tried anything for her symptoms. She denies fevers, chills, or any other associated symptoms.       Review of Systems   All other systems reviewed and are negative.        PMHx:  Patient Active Problem List   Diagnosis    Class 1 obesity due to excess calories without serious comorbidity in adult    Thalassemia    Adjustment disorder with anxious mood    Mixed hyperlipidemia    Family history of early CAD    Chest pain       Prior to Admission medications    Medication Sig Start Date End Date Taking? Authorizing Provider   acetaminophen (TYLENOL) 325 MG tablet Take 2 tablets by mouth every 6 hours as needed for Pain   Yes Provider, MD Trevor   ibuprofen (ADVIL;MOTRIN) 100 MG tablet Take 1 tablet by mouth every 6 hours as needed    Automatic Reconciliation, Ar         Objective:     Vitals:    24 1045   BP: 113/79   Pulse: 80   Resp: 16   Temp: 98.6 °F (37 °C)   SpO2: 96%       Physical Exam  Constitutional:       General: She is not in acute distress.     Appearance: Normal appearance. She is not ill-appearing.   Eyes:      General: No scleral icterus.     Conjunctiva/sclera: Conjunctivae normal.   Cardiovascular:      Rate and Rhythm: Normal rate and regular rhythm.      Heart sounds: Normal heart sounds. No murmur heard.  Pulmonary:      Effort: Pulmonary effort is normal.      Breath sounds: Normal breath sounds. No wheezing or rales.   Neurological:      Mental Status: She is alert.            ASSESSMENT/PLAN:  Below is the assessment and plan developed based on review of

## 2024-05-08 NOTE — PROGRESS NOTES
Verified name and birth date for privacy precautions.   Chart reviewed in preparation for today's visit.     Chief Complaint   Patient presents with    Wrist Pain     Right arm and wrist pain and weakness x 6 months     Congestion     Nasal congestion x 4 days           Health Maintenance Due   Topic    Hepatitis B vaccine (1 of 3 - 3-dose series)    COVID-19 Vaccine (1)    Colorectal Cancer Screen     Shingles vaccine (1 of 2)    Breast cancer screen          Wt Readings from Last 3 Encounters:   05/08/24 90.3 kg (199 lb)   09/08/23 88.9 kg (196 lb)   08/07/23 88.9 kg (196 lb)     Temp Readings from Last 3 Encounters:   05/08/24 98.6 °F (37 °C) (Oral)   09/08/23 97.7 °F (36.5 °C) (Oral)   08/07/23 97.3 °F (36.3 °C) (Temporal)     BP Readings from Last 3 Encounters:   05/08/24 113/79   09/08/23 116/73   08/07/23 121/71     Pulse Readings from Last 3 Encounters:   05/08/24 80   09/08/23 77   08/07/23 62       Social Determinants of Health     Tobacco Use: Low Risk  (5/8/2024)    Patient History     Smoking Tobacco Use: Never     Smokeless Tobacco Use: Never     Passive Exposure: Not on file   Alcohol Use: Not on file   Financial Resource Strain: Low Risk  (9/8/2023)    Overall Financial Resource Strain (CARDIA)     Difficulty of Paying Living Expenses: Not very hard   Food Insecurity: Not on file (9/8/2023)   Transportation Needs: Unknown (9/8/2023)    PRAPARE - Transportation     Lack of Transportation (Medical): Not on file     Lack of Transportation (Non-Medical): No   Physical Activity: Not on file   Stress: Not on file   Social Connections: Not on file   Intimate Partner Violence: Not on file   Depression: Not at risk (5/8/2024)    PHQ-2     PHQ-2 Score: 0   Housing Stability: Unknown (9/8/2023)    Housing Stability Vital Sign     Unable to Pay for Housing in the Last Year: Not on file     Number of Places Lived in the Last Year: Not on file     Unstable Housing in the Last Year: No   Interpersonal Safety: Not

## 2024-05-17 ENCOUNTER — TELEPHONE (OUTPATIENT)
Age: 49
End: 2024-05-17

## 2024-05-17 NOTE — TELEPHONE ENCOUNTER
Pt dropped off [employer] form for Dr. Juares to complete and sign. Pt would like a call when form is ready to be picked up.     Phone Number: [phone]

## 2024-05-17 NOTE — TELEPHONE ENCOUNTER
Per pcp patient needs to be cleared by cardiology.   Attempted to contact patient without success. Left voicemail message requesting a call back to the office

## 2024-05-21 ENCOUNTER — TELEPHONE (OUTPATIENT)
Age: 49
End: 2024-05-21

## 2024-05-21 NOTE — TELEPHONE ENCOUNTER
Reason for call:  TC from patient. Patient would like to know if the form that she dropped off last Thursday for Dr. Juares sign is ready to be picked up? It was form for her to be able to go to the gym.     Is this a new problem: Yes    Date of last appointment:  5/8/2024     Can we respond via [employer]: No    Best call back number:     Maya Weinstein (Self) [phone] (Home)

## 2024-05-22 ENCOUNTER — TELEPHONE (OUTPATIENT)
Age: 49
End: 2024-05-22

## 2024-05-22 DIAGNOSIS — Z82.49 FAMILY HISTORY OF EARLY CAD: Primary | ICD-10-CM

## 2024-05-22 DIAGNOSIS — I20.0 UNSTABLE ANGINA (HCC): ICD-10-CM

## 2024-05-22 DIAGNOSIS — R94.39 ABNORMAL STRESS TEST: ICD-10-CM

## 2024-05-22 NOTE — TELEPHONE ENCOUNTER
Reason for call:  TC from pt. Pt id verified by two identifiers. Pt states she was told that she needs to see a Cardiologist to get her exercise form filled out. Pt would like to get a Cardiologist recommendation from Dr. Juares; name and number. Pt stated she would like to speak with nurse re this information. Pt also stated since Dr. Juares can not fill out exercise form, she would like to pick form up this Friday.     Is this a new problem: Yes    Date of last appointment:  5/8/2024     Can we respond via Voxy: No    Best call back number: [phone]

## 2024-05-23 NOTE — TELEPHONE ENCOUNTER
Left voicemail message for patient that referral and form put up front for . Phone going straight to voicemail

## 2024-05-24 NOTE — TELEPHONE ENCOUNTER
Patient came into office reporting that the cardiologist would not schedule an appointment for her because the referral is . Referral pended and sent to available provider to sign

## 2024-06-03 ENCOUNTER — OFFICE VISIT (OUTPATIENT)
Age: 49
End: 2024-06-03
Payer: COMMERCIAL

## 2024-06-03 VITALS
SYSTOLIC BLOOD PRESSURE: 112 MMHG | WEIGHT: 200 LBS | BODY MASS INDEX: 33.32 KG/M2 | DIASTOLIC BLOOD PRESSURE: 70 MMHG | HEIGHT: 65 IN | OXYGEN SATURATION: 98 % | HEART RATE: 71 BPM

## 2024-06-03 DIAGNOSIS — R07.9 CHEST PAIN, UNSPECIFIED TYPE: Primary | ICD-10-CM

## 2024-06-03 DIAGNOSIS — R07.9 CHEST PAIN, UNSPECIFIED TYPE: ICD-10-CM

## 2024-06-03 PROCEDURE — 93000 ELECTROCARDIOGRAM COMPLETE: CPT | Performed by: INTERNAL MEDICINE

## 2024-06-03 PROCEDURE — 99204 OFFICE O/P NEW MOD 45 MIN: CPT | Performed by: INTERNAL MEDICINE

## 2024-06-03 RX ORDER — ROSUVASTATIN CALCIUM 10 MG/1
10 TABLET, COATED ORAL NIGHTLY
Qty: 90 TABLET | Refills: 3 | Status: SHIPPED | OUTPATIENT
Start: 2024-06-03 | End: 2024-06-04 | Stop reason: SINTOL

## 2024-06-03 RX ORDER — ASPIRIN 81 MG/1
81 TABLET ORAL DAILY
Qty: 90 TABLET | Refills: 0 | Status: SHIPPED | OUTPATIENT
Start: 2024-06-03

## 2024-06-03 ASSESSMENT — PATIENT HEALTH QUESTIONNAIRE - PHQ9
SUM OF ALL RESPONSES TO PHQ QUESTIONS 1-9: 0
SUM OF ALL RESPONSES TO PHQ QUESTIONS 1-9: 0
SUM OF ALL RESPONSES TO PHQ9 QUESTIONS 1 & 2: 0
SUM OF ALL RESPONSES TO PHQ QUESTIONS 1-9: 0
2. FEELING DOWN, DEPRESSED OR HOPELESS: NOT AT ALL
1. LITTLE INTEREST OR PLEASURE IN DOING THINGS: NOT AT ALL
SUM OF ALL RESPONSES TO PHQ QUESTIONS 1-9: 0

## 2024-06-03 NOTE — PATIENT INSTRUCTIONS
Dear Maya,    Thank you for visiting our office today. We are grateful for your proactive approach to your health concerns and your dedication to improving your well-being.    Following our discussion, here are the essential instructions and recommendations for your care plan:    - Heart Catheterization: It is imperative to undergo this procedure to investigate any potential blockages in the arteries of your heart. We advise scheduling this before your upcoming trip to [facility] at the end of June.    - Medication: Begin taking Aspirin 81 mg daily as a preventative measure against heart attacks.    - Activity Caution: Please refrain from engaging in strenuous activities and gym workouts until after the heart catheterization is completed and you have received clearance from a cardiologist.    - Follow-Up: It is crucial to arrange the heart catheterization as soon as possible. Our office will work closely with either Dr. Leblanc or Dr. Baird to ensure this is organized promptly.    Given your significant family history of heart disease, addressing these recommendations promptly is vital to prevent serious complications. Should you have any questions or require further details about the planned procedures or medications, please feel free to reach out to our office.    We wish you the best in health and are here to support you every step of the way.    Sincerely,    Dr. Luis Boswell MD  Cardiology

## 2024-06-03 NOTE — PROGRESS NOTES
Cardiology  Clinic -   New Patient  Visit   Date of Service : 6/3/2024    Name: Maya Weinstein  Patient ID: [id number]  Age: 48 y.o. Sex: female YOB: 1975    Author: EDGARDO GOTTLIEB MD    PCP: Duy Juares DO    Referring Provider:Duy Juares DO    Reason for Visit / CC:    Chief Complaint   Patient presents with    New Patient      Abnormal exercise stress test with chest pain     ASSESSMENT         Chest pain with abnormal exercise stress test  Hyperlipidemia uncontrolled  Obesity  Family history of premature heart disease  Migraine         PLAN     Patient presented to the clinic for establishment of cardiovascular care.  Unfortunately has chest pain with abnormal exercise stress test since then continues to have intermittent episodes of chest pain.  Will proceed with left heart catheterization to evaluate for coronary stenosis  Will start aspirin 81 mg p.o. daily  With benefit alternative of left heart catheterization discussed.  Regarding borderline hyperlipidemia plan to start Crestor 10 mg once daily  I would recommend holding of gym activities and strenuous exercise until patient undergoes heart catheterization  Risk of sudden cardiac death, cardiogenic shock and acute myocardial infarction discussed with the patient if she does not undergo left heart catheterization  Advised to follow-up with PCP for management of migraine  Patient advised strict compliance with medication and follow-up and doctor recommendations  Patient advised aggressive risk factor and lifestyle modification- diet and exercise counseling done   patient advised strict compliance with medication and follow-up   handouts were given to patient along with  trustworthy websites for more information       Edgardo Gottlieb MD FACC FACP  Cardiologist   [facility] [place]  [facility]  6/3/2024  3:58 PM      Orders Placed This Encounter    EKG 12 Lead     Order Specific

## 2024-06-04 ENCOUNTER — TELEPHONE (OUTPATIENT)
Age: 49
End: 2024-06-04

## 2024-06-04 ENCOUNTER — PREP FOR PROCEDURE (OUTPATIENT)
Age: 49
End: 2024-06-04

## 2024-06-04 DIAGNOSIS — R07.9 CHEST PAIN, UNSPECIFIED TYPE: Primary | ICD-10-CM

## 2024-06-04 DIAGNOSIS — R94.39 ABNORMAL STRESS TEST: ICD-10-CM

## 2024-06-04 LAB
ANION GAP SERPL CALC-SCNC: 4 MMOL/L (ref 5–15)
BUN SERPL-MCNC: 16 MG/DL (ref 6–20)
BUN/CREAT SERPL: 18 (ref 12–20)
CALCIUM SERPL-MCNC: 9.7 MG/DL (ref 8.5–10.1)
CHLORIDE SERPL-SCNC: 108 MMOL/L (ref 97–108)
CO2 SERPL-SCNC: 25 MMOL/L (ref 21–32)
CREAT SERPL-MCNC: 0.87 MG/DL (ref 0.55–1.02)
ERYTHROCYTE [DISTWIDTH] IN BLOOD BY AUTOMATED COUNT: 14.7 % (ref 11.5–14.5)
GLUCOSE SERPL-MCNC: 91 MG/DL (ref 65–100)
HCT VFR BLD AUTO: 39.7 % (ref 35–47)
HGB BLD-MCNC: 12.1 G/DL (ref 11.5–16)
MCH RBC QN AUTO: 22.8 PG (ref 26–34)
MCHC RBC AUTO-ENTMCNC: 30.5 G/DL (ref 30–36.5)
MCV RBC AUTO: 74.9 FL (ref 80–99)
NRBC # BLD: 0 K/UL (ref 0–0.01)
NRBC BLD-RTO: 0 PER 100 WBC
PLATELET # BLD AUTO: 312 K/UL (ref 150–400)
PMV BLD AUTO: 10.7 FL (ref 8.9–12.9)
POTASSIUM SERPL-SCNC: 3.8 MMOL/L (ref 3.5–5.1)
RBC # BLD AUTO: 5.3 M/UL (ref 3.8–5.2)
SODIUM SERPL-SCNC: 137 MMOL/L (ref 136–145)
WBC # BLD AUTO: 9 K/UL (ref 3.6–11)

## 2024-06-04 RX ORDER — ATORVASTATIN CALCIUM 40 MG/1
40 TABLET, FILM COATED ORAL DAILY
Qty: 30 TABLET | Refills: 3 | Status: SHIPPED | OUTPATIENT
Start: 2024-06-04

## 2024-06-04 RX ORDER — SODIUM CHLORIDE 9 MG/ML
INJECTION, SOLUTION INTRAVENOUS CONTINUOUS
Status: CANCELLED | OUTPATIENT
Start: 2024-06-04 | End: 2024-06-04

## 2024-06-04 RX ORDER — SODIUM CHLORIDE 0.9 % (FLUSH) 0.9 %
5-40 SYRINGE (ML) INJECTION PRN
Status: CANCELLED | OUTPATIENT
Start: 2024-06-04

## 2024-06-04 NOTE — TELEPHONE ENCOUNTER
Dr. Boswell placed order for atorvastatin and sent the medication to the patient's pharmacy.     Telephone call made to patient. Left message letting the patient know that Dr. Boswell sent in a different statin for her. Switched Crestor to Lipitor.    No

## 2024-06-04 NOTE — TELEPHONE ENCOUNTER
Spoke to patient and scheduled her for [facility] with Dr. Leblanc on 6/12 @ 1:15 pm with 11:15 am arrival. Instructions gone over with patient and advised labs already done, no medications to hold. Patient verbalized understanding and had no questions at the time of call.    Patient identification verified x2.         Patient Instructions    Catheterization     Pre-procedure instructions  Lab work: Already done.  [facility] Draw Sites:  Heart & Vascular [place]: [address] [facility] [address]  [place]: [address] [facility] [address]  [place]: [address] [place] [address] [facility] [address]  [facility]: [address] [facility] [address]  [place]: [address] [facility] [address]  [facility]: [address]  [address] [facility] [address]  [place]: [address] [facility] [address]  [facility]: [address] [place] [address]  The night before the procedure nothing to eat or drink after midnight, you may take approved medications the morning of the procedure with a few sips of water.  Stop blood thinners 2 days prior to procedure EXCEPT: Brilinta, Plavix or Aspirin  Medications restrictions: No medications to hold.    Procedure day  Have a  that will bring you and take you home (6-8 hours)  Have a responsible adult that can stay with you for 24 hours  Bring ID and insurance card  Wear comfortable clothing  Leave valuables at home,   Bring: dentures, hearing aids, or glasses  Bring overnight bag (just in case of an overnight stay)  Where to report   Trisha: go through main entrance and to the left is outpatient registration    Date of procedure: 6/12 w/ Dr. Leblanc  Arrival Time: 11:15 am    Post procedure instructions  No driving for 24 hours  No heavy lifting (over 10lbs) or strenuous activity for 48 hours  No baths, swimming, hot tubs, or spas for a week  The band aid over the

## 2024-06-04 NOTE — TELEPHONE ENCOUNTER
----- Message from Janiya Alan RN sent at 6/4/2024  8:35 AM EDT -----    ----- Message -----  From: Luis Boswell MD  Sent: 6/4/2024   8:27 AM EDT  To: Janiya Alan RN    CBC hemoglobin normal  Creatinine normal continue same treatment

## 2024-06-04 NOTE — TELEPHONE ENCOUNTER
Left message for patient to return call to schedule procedure.    ----- Message from Janiya Alan RN sent at 6/3/2024  4:15 PM EDT -----  Regarding: [facility]  Please order a C for abnormal stress test and chest pain per Dr. Boswell with whomever.     She wants it after Wednesday 6/12 and she leaves for [facility] 6/19.     CPT: 67111  Diagnosis: R07.1, R94.39  Labs: CBC, BMP- lab slips given  Meds: Doesn't need to hold meds.     Thanks

## 2024-06-04 NOTE — TELEPHONE ENCOUNTER
While speaking to patient over the phone while scheduling her procedure she mentioned to me that she would like to have her Crestor changed to 5 mg or changed to Lipitor. Patient states that a lot of her family has been on Crestor and no one did well on it and they call had side effects. Please advise.

## 2024-06-11 ENCOUNTER — TELEPHONE (OUTPATIENT)
Age: 49
End: 2024-06-11

## 2024-06-12 ENCOUNTER — HOSPITAL ENCOUNTER (OUTPATIENT)
Facility: HOSPITAL | Age: 49
Discharge: HOME | End: 2024-06-12
Attending: INTERNAL MEDICINE | Admitting: INTERNAL MEDICINE
Payer: COMMERCIAL

## 2024-06-12 VITALS
BODY MASS INDEX: 34.15 KG/M2 | SYSTOLIC BLOOD PRESSURE: 130 MMHG | DIASTOLIC BLOOD PRESSURE: 70 MMHG | OXYGEN SATURATION: 94 % | RESPIRATION RATE: 16 BRPM | HEART RATE: 73 BPM | HEIGHT: 64 IN | WEIGHT: 200 LBS | TEMPERATURE: 98.1 F

## 2024-06-12 DIAGNOSIS — R94.39 ABNORMAL STRESS TEST: ICD-10-CM

## 2024-06-12 DIAGNOSIS — R07.9 CHEST PAIN: ICD-10-CM

## 2024-06-12 LAB — ECHO BSA: 2.02 M2

## 2024-06-12 PROCEDURE — C1713 ANCHOR/SCREW BN/BN,TIS/BN: HCPCS | Performed by: INTERNAL MEDICINE

## 2024-06-12 PROCEDURE — 99153 MOD SED SAME PHYS/QHP EA: CPT | Performed by: INTERNAL MEDICINE

## 2024-06-12 PROCEDURE — 2709999900 HC NON-CHARGEABLE SUPPLY: Performed by: INTERNAL MEDICINE

## 2024-06-12 PROCEDURE — 6360000002 HC RX W HCPCS: Performed by: INTERNAL MEDICINE

## 2024-06-12 PROCEDURE — 99152 MOD SED SAME PHYS/QHP 5/>YRS: CPT | Performed by: INTERNAL MEDICINE

## 2024-06-12 PROCEDURE — 76937 US GUIDE VASCULAR ACCESS: CPT | Performed by: INTERNAL MEDICINE

## 2024-06-12 PROCEDURE — 6360000004 HC RX CONTRAST MEDICATION: Performed by: INTERNAL MEDICINE

## 2024-06-12 PROCEDURE — 2500000003 HC RX 250 WO HCPCS: Performed by: INTERNAL MEDICINE

## 2024-06-12 PROCEDURE — 93458 L HRT ARTERY/VENTRICLE ANGIO: CPT | Performed by: INTERNAL MEDICINE

## 2024-06-12 PROCEDURE — 2580000003 HC RX 258: Performed by: INTERNAL MEDICINE

## 2024-06-12 PROCEDURE — C1894 INTRO/SHEATH, NON-LASER: HCPCS | Performed by: INTERNAL MEDICINE

## 2024-06-12 RX ORDER — FENTANYL CITRATE 50 UG/ML
INJECTION, SOLUTION INTRAMUSCULAR; INTRAVENOUS PRN
Status: DISCONTINUED | OUTPATIENT
Start: 2024-06-12 | End: 2024-06-12 | Stop reason: HOSPADM

## 2024-06-12 RX ORDER — SODIUM CHLORIDE 0.9 % (FLUSH) 0.9 %
5-40 SYRINGE (ML) INJECTION PRN
Status: DISCONTINUED | OUTPATIENT
Start: 2024-06-12 | End: 2024-06-12 | Stop reason: HOSPADM

## 2024-06-12 RX ORDER — MIDAZOLAM HYDROCHLORIDE 1 MG/ML
INJECTION INTRAMUSCULAR; INTRAVENOUS PRN
Status: DISCONTINUED | OUTPATIENT
Start: 2024-06-12 | End: 2024-06-12 | Stop reason: HOSPADM

## 2024-06-12 RX ORDER — VERAPAMIL HYDROCHLORIDE 2.5 MG/ML
INJECTION, SOLUTION INTRAVENOUS PRN
Status: DISCONTINUED | OUTPATIENT
Start: 2024-06-12 | End: 2024-06-12 | Stop reason: HOSPADM

## 2024-06-12 RX ORDER — LIDOCAINE HYDROCHLORIDE 10 MG/ML
INJECTION, SOLUTION INFILTRATION; PERINEURAL PRN
Status: DISCONTINUED | OUTPATIENT
Start: 2024-06-12 | End: 2024-06-12 | Stop reason: HOSPADM

## 2024-06-12 RX ORDER — SODIUM CHLORIDE 9 MG/ML
INJECTION, SOLUTION INTRAVENOUS PRN
Status: DISCONTINUED | OUTPATIENT
Start: 2024-06-12 | End: 2024-06-12 | Stop reason: HOSPADM

## 2024-06-12 RX ORDER — ACETAMINOPHEN 325 MG/1
650 TABLET ORAL EVERY 4 HOURS PRN
Status: DISCONTINUED | OUTPATIENT
Start: 2024-06-12 | End: 2024-06-12 | Stop reason: HOSPADM

## 2024-06-12 RX ORDER — SODIUM CHLORIDE 9 MG/ML
INJECTION, SOLUTION INTRAVENOUS CONTINUOUS
Status: DISCONTINUED | OUTPATIENT
Start: 2024-06-12 | End: 2024-06-12 | Stop reason: HOSPADM

## 2024-06-12 RX ORDER — SODIUM CHLORIDE 0.9 % (FLUSH) 0.9 %
5-40 SYRINGE (ML) INJECTION EVERY 12 HOURS SCHEDULED
Status: DISCONTINUED | OUTPATIENT
Start: 2024-06-12 | End: 2024-06-12 | Stop reason: HOSPADM

## 2024-06-12 RX ORDER — HEPARIN SODIUM 1000 [USP'U]/ML
INJECTION, SOLUTION INTRAVENOUS; SUBCUTANEOUS PRN
Status: DISCONTINUED | OUTPATIENT
Start: 2024-06-12 | End: 2024-06-12 | Stop reason: HOSPADM

## 2024-06-12 RX ADMIN — SODIUM CHLORIDE: 9 INJECTION, SOLUTION INTRAVENOUS at 11:47

## 2024-06-12 NOTE — PROGRESS NOTES
1343  [facility] Procedural to Recovery REPORT:    Verbal report received from Grant on Maya Weinstein  being received from [facility] Procedural Area for routine progression of care. Report consisted of patient’s Situation, Background, Assessment and Recommendations(SBAR). Information from the following report(s) Procedure, Findings, Medications, and Site Assessment; was reviewed with the receiving clinician. Opportunity for questions and clarification was provided. Assessment completed upon patient’s arrival to Cardiac Cath Lab RECOVERY AREA and care assumed.       Cardiac Cath Lab Recovery Arrival Note:    Maya Weinstein arrived to [facility] recovery area.  Patient procedure= LHC. Patient on cardiac monitor, non-invasive blood pressure, SPO2 monitor. On room air.  IV  of NS on pump at 125 ml/hr. Patient status doing well without problems. Patient is A&Ox 4. Patient reports no complaints.    PROCEDURE SITE CHECK:    Procedure site:without any bleeding and no hematoma, no pain/discomfort reported at procedure site.     No change in patient status. Continue to monitor patient and status.

## 2024-06-12 NOTE — PROGRESS NOTES
Cardiac Cath Lab Recovery Arrival Note:      Maya Weinstein arrived to Cardiac Cath Lab, Recovery Area. Staff introduced to patient. Patient identifiers verified with NAME and DATE OF BIRTH. Procedure verified with patient. Consent forms reviewed and signed by patient or authorized representative and verified. Allergies verified.     Patient and family oriented to department. Patient and family informed of procedure and plan of care.     Questions answered with review. Patient prepped for procedure, per orders from physician, prior to arrival.    Patient on cardiac monitor, non-invasive blood pressure, SPO2 monitor. On Room Air. Patient is A&Ox 4. Patient reports No Pain.     Patient in stretcher, in low position, with side rails up, call bell within reach, patient instructed to call if assistance as needed.    Patient prep in: [facility] Recovery Area, [place] [address].   Patient family : Vinicio/spouse [phone]  Family in: Waiting Room .   Prep by: Viviane Francis RN

## 2024-06-12 NOTE — DISCHARGE INSTRUCTIONS
[facility] Cardiology  Eben Ascencio.  [phone]                         Radial Cardiac Catheterization Discharge Instructions    It is normal to feel tired the first couple days.  Take it easy and follow the physician’s instructions.      CHECK THE CATHETER INSERTION SITE DAILY:    Remove the wrist dressing 24 hours after the procedure.  You may shower 24 hours after the procedure.  Wash with soap and water and pat dry.  Gentle cleaning of the site with soap and water is sufficient, cover with a dry clean dressing or bandage.  Do not apply creams or powders to the area.  No soaking the wrist for 3 days.  Leave the puncture site open to air after 24 hours post-procedure.    CALL THE PHYSICIANS:     If the site becomes red, swollen or feels warm to the touch  If there is bleeding or drainage or if there is unusual pain at the radial site.     If there is any minor oozing, you may apply a band-aid and remove after 12 hours.   If the bleeding continues, hold pressure with the middle finger against the puncture site and the thumb against the back of the wrist,call 911 to be transported to the hospital.  DO NOT DRIVE YOURSELF, OR HAVE ANYONE ELSE DRIVE YOU - CALL 911.    ACTIVITY:   For the first 24 hours do not manipulate the wrist.  No lifting, pushing or pulling over 3-5 pounds with the affected wrist for 7 daysand no straining the insertion site. Do not life grocery bags or the garbage can, do not run the vacuum  or  for 7 days.  Start with short walks as in the hospital and gradually increase as tolerated each day.  It is recommended to walk 30 minutes 5-7 days per week.  Follow your physician’s instructions on activity.  Avoid walking outside in extremes of heat or cold.  Walk inside when it is cold and windy or hot and humid.     Things to keep in mind:  No driving for at least 24 hours, or as designated by your physician.  Limit the number of times you go up and down the stairs  Take

## 2024-06-12 NOTE — PROGRESS NOTES
1343  Pt arrived to recovery room post procedure.  Arm immobilizer placed on affected wrist.     1400  Pt eating and tolerating PO diet well.    TR BANDS:  1430  Began removal of air from TR BAND. No bleeding and no hematoma present at sites.    1515  Removal of air from TR Band complete. No bleeding and no hematoma.    1525  Educated patient about their sedation precautions such as not driving, operating any machinery, or signing legal documents 24 hours post procedure.  Reviewed discharge instructions, including medications and site care using the teach back method. Answered all questions. Verbalized understanding. Pt had an opportunity to ask questions. Pt is also aware of how to handle a site if it starts bleeding or develops a hematoma.    1530  TR Band removed. No bleeding and no hematoma present. Tegaderm and gauze dressing applied.   Pt walked to the restroom and voided successfully. Site(s) have no bleeding and no hematoma present    1539  RN removed pt IV.  Pt getting dressed  RN called pt ride home.    1550  Arm immobilizer placed on affected wrist.  Pt discharged to ride at main entrance of hospital via wheel chair by RN.  Pt discharged withDischarge Paperwork, Extra Band Aids, Clothing, Wrist Immobilizer, Cell Phone, \"Who Cared For You\" Card, Glasses, and overnight bag (no pt belongings remaining in bay).

## 2024-06-12 NOTE — PROGRESS NOTES
CATH LAB to RECOVERY ROOM REPORT    Procedure: [facility]    MD: BRIGIDA Leblanc MD    The procedure was diagnostic only.    Verbal Report given to Recovery Nurse on patient being transferred to Cardiac Cath Lab  for routine post-op. Patient stable upon transfer to .  Vitals, mental status, MAR, procedural summary discussed with recovery RN.    Medication given during procedure:    Contrast:63mL                          Heparin:2500units     Versed:4mg                                                               Fentanyl:75mcg                                                             Sheaths:    Right radial sheath pulled at 1335 pm, band at 9mL of air

## 2024-06-12 NOTE — PROGRESS NOTES
Cardiac Cath Lab Procedure Area Arrival Note:    Maya Weinstein arrived to Cardiac Cath Lab, Procedure Area. Patient identifiers verified with NAME and DATE OF BIRTH. Procedure verified with patient. Consent forms verified. Allergies verified. Patient informed of procedure and plan of care. Questions answered with review. Patient voiced understanding of procedure and plan of care.    Patient on cardiac monitor, non-invasive blood pressure, SPO2 monitor. On RA.  IV of NS on pump at 25 ml/hr. Patient status doing well without problems. Patient is A&Ox 4. Patient reports no complaints.     Patient medicated during procedure with orders obtained and verified by Dr. Leblanc.    Refer to patients Cardiac Cath Lab PROCEDURE REPORT for vital signs, assessment, status, and response during procedure, printed at end of case. Printed report on chart or scanned into chart.

## 2024-06-13 ENCOUNTER — PATIENT MESSAGE (OUTPATIENT)
Age: 49
End: 2024-06-13

## 2024-09-06 ENCOUNTER — OFFICE VISIT (OUTPATIENT)
Age: 49
End: 2024-09-06
Payer: COMMERCIAL

## 2024-09-06 VITALS
OXYGEN SATURATION: 97 % | HEIGHT: 64 IN | HEART RATE: 83 BPM | WEIGHT: 203 LBS | BODY MASS INDEX: 34.66 KG/M2 | SYSTOLIC BLOOD PRESSURE: 136 MMHG | DIASTOLIC BLOOD PRESSURE: 84 MMHG

## 2024-09-06 DIAGNOSIS — E78.2 MIXED HYPERLIPIDEMIA: Primary | ICD-10-CM

## 2024-09-06 PROCEDURE — 99214 OFFICE O/P EST MOD 30 MIN: CPT | Performed by: INTERNAL MEDICINE

## 2024-09-06 ASSESSMENT — PATIENT HEALTH QUESTIONNAIRE - PHQ9
1. LITTLE INTEREST OR PLEASURE IN DOING THINGS: NOT AT ALL
SUM OF ALL RESPONSES TO PHQ QUESTIONS 1-9: 0
SUM OF ALL RESPONSES TO PHQ9 QUESTIONS 1 & 2: 0
2. FEELING DOWN, DEPRESSED OR HOPELESS: NOT AT ALL

## 2024-09-06 NOTE — PROGRESS NOTES
04:33 PM        Lab Results   Component Value Date/Time     06/03/2024 04:33 PM    K 3.8 06/03/2024 04:33 PM     06/03/2024 04:33 PM    CO2 25 06/03/2024 04:33 PM    BUN 16 06/03/2024 04:33 PM    GFRAA 89 01/02/2020 08:57 AM    ALT 19 08/07/2023 12:00 AM    ALT 17 08/07/2023 12:00 AM        Lab Results   Component Value Date    CHOL 274 (H) 08/07/2023    CHOL 283 (H) 08/07/2023    TRIG 321 (H) 08/07/2023    TRIG 319 (H) 08/07/2023    HDL 53 08/07/2023    HDL 53 08/07/2023    VLDL 60 (H) 08/07/2023    VLDL 61 (H) 08/07/2023        Thank you,  Duy Juares DO for involving me in the care of  Maya Weinstein. Please do not hesitate to contact me for further questions/concerns.       Patient Care Team:  Duy Juares DO as PCP - General (Internal Medicine)  Duy Juares DO as PCP - Empaneled Provider    [facility] [place]  [facility] [id number], [address]  [place] [address]  Fax : [phone]     [facility] -- Cardiology, [place]  [address]  [address]  [place] [address]  Fax: [phone]    [facility]  Call center: (P) [phone]  (F) [phone]    The patient (or guardian, if applicable) and other individuals in attendance with the patient were advised that Artificial Intelligence will be utilized during this visit to record and process the conversation to generate a clinical note. The patient (or guardian, if applicable) and other individuals in attendance at the appointment consented to the use of AI, including the recording.       Voice - recognition dictation software was used in  the generation of this note.  Errors may exist. if there is any potential confusion or discrepancy, please feel free to contact me for clarification

## 2024-09-10 ENCOUNTER — OFFICE VISIT (OUTPATIENT)
Age: 49
End: 2024-09-10
Payer: COMMERCIAL

## 2024-09-10 VITALS
HEIGHT: 65 IN | WEIGHT: 202.2 LBS | SYSTOLIC BLOOD PRESSURE: 117 MMHG | RESPIRATION RATE: 16 BRPM | DIASTOLIC BLOOD PRESSURE: 74 MMHG | TEMPERATURE: 97 F | OXYGEN SATURATION: 96 % | BODY MASS INDEX: 33.69 KG/M2 | HEART RATE: 60 BPM

## 2024-09-10 DIAGNOSIS — R94.39 ABNORMAL STRESS TEST: ICD-10-CM

## 2024-09-10 DIAGNOSIS — E78.2 MIXED HYPERLIPIDEMIA: ICD-10-CM

## 2024-09-10 DIAGNOSIS — Z00.00 ENCOUNTER FOR WELL ADULT EXAM WITHOUT ABNORMAL FINDINGS: Primary | ICD-10-CM

## 2024-09-10 DIAGNOSIS — R53.83 FATIGUE, UNSPECIFIED TYPE: ICD-10-CM

## 2024-09-10 DIAGNOSIS — Z13.1 DIABETES MELLITUS SCREENING: ICD-10-CM

## 2024-09-10 DIAGNOSIS — E66.09 CLASS 1 OBESITY DUE TO EXCESS CALORIES WITHOUT SERIOUS COMORBIDITY WITH BODY MASS INDEX (BMI) OF 33.0 TO 33.9 IN ADULT: ICD-10-CM

## 2024-09-10 DIAGNOSIS — F43.22 ADJUSTMENT DISORDER WITH ANXIOUS MOOD: ICD-10-CM

## 2024-09-10 DIAGNOSIS — R68.89 DECREASED EXERCISE TOLERANCE: ICD-10-CM

## 2024-09-10 DIAGNOSIS — Z00.00 ENCOUNTER FOR WELL ADULT EXAM WITHOUT ABNORMAL FINDINGS: ICD-10-CM

## 2024-09-10 DIAGNOSIS — E78.5 HYPERLIPIDEMIA, UNSPECIFIED HYPERLIPIDEMIA TYPE: ICD-10-CM

## 2024-09-10 DIAGNOSIS — Z12.11 SCREEN FOR COLON CANCER: ICD-10-CM

## 2024-09-10 PROCEDURE — 99396 PREV VISIT EST AGE 40-64: CPT | Performed by: STUDENT IN AN ORGANIZED HEALTH CARE EDUCATION/TRAINING PROGRAM

## 2024-09-10 SDOH — ECONOMIC STABILITY: FOOD INSECURITY: WITHIN THE PAST 12 MONTHS, THE FOOD YOU BOUGHT JUST DIDN'T LAST AND YOU DIDN'T HAVE MONEY TO GET MORE.: NEVER TRUE

## 2024-09-10 SDOH — ECONOMIC STABILITY: FOOD INSECURITY: WITHIN THE PAST 12 MONTHS, YOU WORRIED THAT YOUR FOOD WOULD RUN OUT BEFORE YOU GOT MONEY TO BUY MORE.: NEVER TRUE

## 2024-09-10 SDOH — ECONOMIC STABILITY: INCOME INSECURITY: HOW HARD IS IT FOR YOU TO PAY FOR THE VERY BASICS LIKE FOOD, HOUSING, MEDICAL CARE, AND HEATING?: NOT HARD AT ALL

## 2024-09-10 ASSESSMENT — PATIENT HEALTH QUESTIONNAIRE - PHQ9
SUM OF ALL RESPONSES TO PHQ QUESTIONS 1-9: 0
SUM OF ALL RESPONSES TO PHQ QUESTIONS 1-9: 0
2. FEELING DOWN, DEPRESSED OR HOPELESS: NOT AT ALL
SUM OF ALL RESPONSES TO PHQ QUESTIONS 1-9: 0
SUM OF ALL RESPONSES TO PHQ9 QUESTIONS 1 & 2: 0
SUM OF ALL RESPONSES TO PHQ QUESTIONS 1-9: 0
1. LITTLE INTEREST OR PLEASURE IN DOING THINGS: NOT AT ALL

## 2024-09-11 PROBLEM — R68.89 DECREASED EXERCISE TOLERANCE: Status: ACTIVE | Noted: 2024-09-11

## 2024-09-11 ASSESSMENT — ENCOUNTER SYMPTOMS
SHORTNESS OF BREATH: 0
NAUSEA: 0
BLOOD IN STOOL: 0
COUGH: 0
CONSTIPATION: 0
DIARRHEA: 0
ABDOMINAL PAIN: 0
VOMITING: 0

## 2025-03-04 ENCOUNTER — TELEPHONE (OUTPATIENT)
Age: 50
End: 2025-03-04

## 2025-03-04 NOTE — TELEPHONE ENCOUNTER
Reason for call:  TC from pt. Pt id verified by two identifiers. Pt states she is going to [place] and the [facility] on June 30. Pt wants to know if she needs to have a physical done before she gets travel vaccinations.     Is this a new problem: Yes    Date of last appointment:  9/10/2024     Can we respond via [employer] [facility]: No - pt would like a call from nurse, please.     Best call back number: [phone]

## (undated) DEVICE — ANGIOGRAPHIC CATHETER: Brand: IMPULSE™

## (undated) DEVICE — KIT HND CTRL 3 W STPCOCK ROT END 54IN PREM HI PRSS TBNG AT

## (undated) DEVICE — PACK PROCEDURE SURG HRT CATH

## (undated) DEVICE — KIT AT-X65 ANGIOTOUCH HAND CONTROLLER

## (undated) DEVICE — SPLINT WR VELC FOAM NEUT POS DISP FOR RAD ART ACC SFT STRP

## (undated) DEVICE — ANGIOGRAPHY KIT

## (undated) DEVICE — WASTE KIT - ST MARY: Brand: MEDLINE INDUSTRIES, INC.

## (undated) DEVICE — MEDTRONIC JL3.5 DIAGNOSTIC CATHETER

## (undated) DEVICE — KIT MFLD ISOLATN NACL CNTRST PRT TBNG SPIK W/ PRSS TRNSDUC

## (undated) DEVICE — TORCON NB ADVANTAGE CATHETER: Brand: TORCON NB

## (undated) DEVICE — KIT MED IMAG CNTRST AGNT W/ IOPAMIDOL REUSE

## (undated) DEVICE — CATHETER 5FR PIGSTR MEDTRONIC 110CM

## (undated) DEVICE — GLIDESHEATH SLENDER ACCESS KIT: Brand: GLIDESHEATH SLENDER

## (undated) DEVICE — 3M™ TEGADERM™ TRANSPARENT FILM DRESSING FRAME STYLE, 1626W, 4 IN X 4-3/4 IN (10 CM X 12 CM), 50/CT 4CT/CASE: Brand: 3M™ TEGADERM™

## (undated) DEVICE — SPECIAL PROCEDURE DRAPE 32" X 34": Brand: SPECIAL PROCEDURE DRAPE